# Patient Record
Sex: FEMALE | Race: OTHER | URBAN - METROPOLITAN AREA
[De-identification: names, ages, dates, MRNs, and addresses within clinical notes are randomized per-mention and may not be internally consistent; named-entity substitution may affect disease eponyms.]

---
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
## 2024-01-02 ENCOUNTER — APPOINTMENT (OUTPATIENT)
Dept: OBSTETRICS AND GYNECOLOGY | Facility: CLINIC | Age: 19
End: 2024-01-02
Payer: COMMERCIAL

## 2024-01-11 ENCOUNTER — OFFICE VISIT (OUTPATIENT)
Dept: OBSTETRICS AND GYNECOLOGY | Facility: CLINIC | Age: 19
End: 2024-01-11
Payer: COMMERCIAL

## 2024-01-11 VITALS
DIASTOLIC BLOOD PRESSURE: 72 MMHG | HEIGHT: 64 IN | BODY MASS INDEX: 29.93 KG/M2 | WEIGHT: 175.31 LBS | SYSTOLIC BLOOD PRESSURE: 114 MMHG

## 2024-01-11 DIAGNOSIS — Z30.019 ENCOUNTER FOR FEMALE BIRTH CONTROL: Primary | ICD-10-CM

## 2024-01-11 DIAGNOSIS — R39.9 UTI SYMPTOMS: ICD-10-CM

## 2024-01-11 LAB — PREGNANCY TEST URINE, POC: NEGATIVE

## 2024-01-11 PROCEDURE — 11981 INSERTION DRUG DLVR IMPLANT: CPT | Performed by: ADVANCED PRACTICE MIDWIFE

## 2024-01-11 PROCEDURE — 81025 URINE PREGNANCY TEST: CPT | Performed by: ADVANCED PRACTICE MIDWIFE

## 2024-01-11 PROCEDURE — 87086 URINE CULTURE/COLONY COUNT: CPT

## 2024-01-11 PROCEDURE — 1036F TOBACCO NON-USER: CPT | Performed by: ADVANCED PRACTICE MIDWIFE

## 2024-01-11 RX ORDER — MULTIVIT-MIN/IRON FUM/FOLIC AC 7.5 MG-4
1 TABLET ORAL DAILY
COMMUNITY

## 2024-01-11 RX ORDER — ETONOGESTREL 68 MG/1
1 IMPLANT SUBCUTANEOUS ONCE
COMMUNITY

## 2024-01-11 RX ORDER — LIDOCAINE HYDROCHLORIDE 10 MG/ML
5 INJECTION INFILTRATION; PERINEURAL ONCE
Status: COMPLETED | OUTPATIENT
Start: 2024-01-11 | End: 2024-01-11

## 2024-01-11 RX ADMIN — LIDOCAINE HYDROCHLORIDE 50 MG: 10 INJECTION INFILTRATION; PERINEURAL at 16:27

## 2024-01-11 ASSESSMENT — PAIN SCALES - GENERAL: PAINLEVEL_OUTOF10: 0 - NO PAIN

## 2024-01-11 NOTE — PROGRESS NOTES
NEXPLANON INSERTION PROCEDURE NOTE      Patient here for Nexplanon insertion. No concerns or questions. Reviewed risk/benefits/and insertion and removal of nexplanon. Patient also c/o UTI symptoms and would like her urine checked for an UTI. Urine will be sent.   Patient's pre-procedure questions were answered and a written informed consent form was signed.   Urine hCG negative.  Recommended insertion site on nondominant upper Right arm identified and marked with a pen.  Patient placed supine with arm flexed and hand up above her head.  Insertion site prepped with ChloraPrep.  Site anesthetized with 3 mL of 1% lidocaine.  Skin check for sensation of be adequately anesthetized.  Needle of Nexplanon applicator device inserted at the marked site at a slight angle, then once the skin was punctured the device was lowered to a horizontal position parallel to the arm and with superior traction the needle was completely inserted in the subcuticular space until applicator shaft touched the skin.  The Nexplanon was then unloaded from the applicator device and the applicator was removed from the field.  Inspection of the applicator device revealed the Nexplanon to be absent.  The Nexplanon device was easily palpated by both myself and the patient in the arm.  The site was closed with a Band-Aid.  A compressive gauze wrap was placed around the arm to prevent bruising.  The patient tolerated the procedure well.  There were no complications.  EBL minimal.    ARLET Mcclure-NASREENonsent

## 2024-01-12 LAB — BACTERIA UR CULT: ABNORMAL

## 2024-01-15 ENCOUNTER — TELEPHONE (OUTPATIENT)
Dept: OBSTETRICS AND GYNECOLOGY | Facility: CLINIC | Age: 19
End: 2024-01-15
Payer: COMMERCIAL

## 2024-01-15 DIAGNOSIS — N30.00 ACUTE CYSTITIS WITHOUT HEMATURIA: Primary | ICD-10-CM

## 2024-01-15 RX ORDER — NITROFURANTOIN 25; 75 MG/1; MG/1
100 CAPSULE ORAL 2 TIMES DAILY
Qty: 10 CAPSULE | Refills: 0 | Status: SHIPPED | OUTPATIENT
Start: 2024-01-15 | End: 2024-01-20

## 2024-01-15 NOTE — TELEPHONE ENCOUNTER
----- Message from GIOVANI Mcclure sent at 1/15/2024  8:11 AM EST -----  +UTI noted on her culture. An Rx was sent in for her to use    thanks  ----- Message -----  From: Kathleen Berkowitz MA  Sent: 1/11/2024   4:06 PM EST  To: GIOVANI Mcclure    Results call.

## 2024-06-20 ENCOUNTER — APPOINTMENT (OUTPATIENT)
Dept: OBSTETRICS AND GYNECOLOGY | Facility: CLINIC | Age: 19
End: 2024-06-20
Payer: COMMERCIAL

## 2024-06-20 VITALS — SYSTOLIC BLOOD PRESSURE: 92 MMHG | DIASTOLIC BLOOD PRESSURE: 60 MMHG | WEIGHT: 170.3 LBS | BODY MASS INDEX: 29.23 KG/M2

## 2024-06-20 DIAGNOSIS — N89.8 VAGINAL DISCHARGE: ICD-10-CM

## 2024-06-20 DIAGNOSIS — N92.1 BREAKTHROUGH BLEEDING ON NEXPLANON: Primary | ICD-10-CM

## 2024-06-20 DIAGNOSIS — Z97.5 BREAKTHROUGH BLEEDING ON NEXPLANON: Primary | ICD-10-CM

## 2024-06-20 PROCEDURE — 87086 URINE CULTURE/COLONY COUNT: CPT

## 2024-06-20 PROCEDURE — 99213 OFFICE O/P EST LOW 20 MIN: CPT | Performed by: ADVANCED PRACTICE MIDWIFE

## 2024-06-20 RX ORDER — ESTRADIOL 1 MG/1
1 TABLET ORAL DAILY
Qty: 30 TABLET | Refills: 2 | Status: SHIPPED | OUTPATIENT
Start: 2024-06-20 | End: 2025-06-20

## 2024-06-20 RX ORDER — METRONIDAZOLE 500 MG/1
500 TABLET ORAL 2 TIMES DAILY
Qty: 14 TABLET | Refills: 2 | Status: SHIPPED | OUTPATIENT
Start: 2024-06-20 | End: 2024-06-27

## 2024-06-20 NOTE — PROGRESS NOTES
GYNECOLOGY PROGRESS NOTE        CC:  Patient has Nexplanon placed 1/11/24. No bleeding for several months after insertion and then she started bleeding and has been bleeding almost daily x 3 months. She also c/o her Ph feels off and she feels she has an infection because she were tampons/pads a lot due to the spotting. Patient has no concerns about sexual partners. She has also noted pain with intercourse but she has been bleeding for several months now.   Chief Complaint   Patient presents with    Follow-up     Est pt visit.   Patient states Aub.   States had a period for 3 months straight. Has the nexplanon currently        HPI:  Oriana Chaudhary is here with a complaints of bleeding with nexplanon, concerns about her vaginal Ph, and pain with intercourse.  ROS:  GYN - see HPI        PHYSICAL EXAM:  BP 92/60 (BP Location: Left arm, Patient Position: Sitting, BP Cuff Size: Adult)   Wt 77.2 kg (170 lb 4.8 oz)   BMI 29.23 kg/m²   GEN:  A&O, NAD  PSYCH:  normal affect, non-anxious      IMPRESSION/PLAN:    A: Bleeding with nexplanon. Vaginal discharge. Pain with intercourse since bleeding started.   Plan: 1. Urine cx sent. 2. Rx sent for Flagyl 500mg BID x 7days due to discharge. 3. Rx sent in for Estrace 1mg PO x 30 days. If bleeding does not resolve or resumes with the same pattern then will use 1-2 months of OCPS skipping placebos and if bleeding still not controlled or monthly menses then consider another birth control option.     Problem List Items Addressed This Visit    None       ARLET Mcclure-JABARI

## 2024-06-21 LAB — BACTERIA UR CULT: NORMAL

## 2024-09-26 ENCOUNTER — OFFICE VISIT (OUTPATIENT)
Dept: OBSTETRICS AND GYNECOLOGY | Facility: CLINIC | Age: 19
End: 2024-09-26
Payer: COMMERCIAL

## 2024-09-26 VITALS
HEART RATE: 73 BPM | HEIGHT: 64 IN | SYSTOLIC BLOOD PRESSURE: 111 MMHG | DIASTOLIC BLOOD PRESSURE: 70 MMHG | WEIGHT: 171.4 LBS | BODY MASS INDEX: 29.26 KG/M2

## 2024-09-26 DIAGNOSIS — N89.8 VAGINAL DISCHARGE: ICD-10-CM

## 2024-09-26 DIAGNOSIS — T19.2XXA FOREIGN BODY IN VAGINA, INITIAL ENCOUNTER: ICD-10-CM

## 2024-09-26 PROCEDURE — 87205 SMEAR GRAM STAIN: CPT

## 2024-09-26 PROCEDURE — 99213 OFFICE O/P EST LOW 20 MIN: CPT | Performed by: MIDWIFE

## 2024-09-26 PROCEDURE — 3008F BODY MASS INDEX DOCD: CPT | Performed by: MIDWIFE

## 2024-09-26 NOTE — PROGRESS NOTES
Subjective   Oriana Chaudhary is a 19 y.o. female who presents for evaluation of an abnormal vaginal discharge. Symptoms have been present for 2 days. Vaginal symptoms: local irritation.     Objective   Physical Exam  Constitutional:       Appearance: Normal appearance.   HENT:      Nose: Nose normal.   Eyes:      Pupils: Pupils are equal, round, and reactive to light.   Pulmonary:      Effort: Pulmonary effort is normal.   Genitourinary:     General: Normal vulva.      Comments: Discharge noted throughout the vagina and cervix. No retained objects noted throughout. No lesions. No cysts. Intact throughout.  Musculoskeletal:      Cervical back: Normal range of motion.   Neurological:      Mental Status: She is alert.          Assessment/Plan   A: Vaginitis    P: Reviewed BP, weight     Vaginitis swab     Will treat per results     RTO for annual and sooner PRN

## 2024-09-29 LAB
CLUE CELLS VAG LPF-#/AREA: ABNORMAL /[LPF]
NUGENT SCORE: 3
YEAST VAG WET PREP-#/AREA: PRESENT

## 2024-10-02 DIAGNOSIS — B37.31 VULVOVAGINAL CANDIDIASIS: Primary | ICD-10-CM

## 2024-10-02 RX ORDER — FLUCONAZOLE 150 MG/1
150 TABLET ORAL ONCE
Qty: 1 TABLET | Refills: 0 | Status: SHIPPED | OUTPATIENT
Start: 2024-10-02 | End: 2024-10-02

## 2025-04-21 ENCOUNTER — APPOINTMENT (OUTPATIENT)
Dept: PRIMARY CARE | Facility: CLINIC | Age: 20
End: 2025-04-21
Payer: COMMERCIAL

## 2025-04-21 VITALS
HEIGHT: 64 IN | DIASTOLIC BLOOD PRESSURE: 80 MMHG | BODY MASS INDEX: 28.17 KG/M2 | HEART RATE: 90 BPM | SYSTOLIC BLOOD PRESSURE: 110 MMHG | TEMPERATURE: 98 F | RESPIRATION RATE: 14 BRPM | WEIGHT: 165 LBS | OXYGEN SATURATION: 96 %

## 2025-04-21 DIAGNOSIS — Z13.220 SCREENING FOR HYPERLIPIDEMIA: ICD-10-CM

## 2025-04-21 DIAGNOSIS — Z00.00 WELLNESS EXAMINATION: Primary | ICD-10-CM

## 2025-04-21 DIAGNOSIS — E55.9 VITAMIN D DEFICIENCY: ICD-10-CM

## 2025-04-21 DIAGNOSIS — N92.6 ABNORMAL MENSTRUAL PERIODS: ICD-10-CM

## 2025-04-21 DIAGNOSIS — Z80.3 FAMILY HISTORY OF BREAST CANCER: ICD-10-CM

## 2025-04-21 DIAGNOSIS — Z23 NEED FOR TETANUS BOOSTER: ICD-10-CM

## 2025-04-21 DIAGNOSIS — E53.8 VITAMIN B12 DEFICIENCY: ICD-10-CM

## 2025-04-21 PROCEDURE — 90471 IMMUNIZATION ADMIN: CPT | Performed by: INTERNAL MEDICINE

## 2025-04-21 PROCEDURE — 99385 PREV VISIT NEW AGE 18-39: CPT | Performed by: INTERNAL MEDICINE

## 2025-04-21 PROCEDURE — 90715 TDAP VACCINE 7 YRS/> IM: CPT | Performed by: INTERNAL MEDICINE

## 2025-04-21 PROCEDURE — 3008F BODY MASS INDEX DOCD: CPT | Performed by: INTERNAL MEDICINE

## 2025-04-21 ASSESSMENT — PATIENT HEALTH QUESTIONNAIRE - PHQ9
1. LITTLE INTEREST OR PLEASURE IN DOING THINGS: NOT AT ALL
2. FEELING DOWN, DEPRESSED OR HOPELESS: NOT AT ALL
SUM OF ALL RESPONSES TO PHQ9 QUESTIONS 1 AND 2: 0

## 2025-04-21 NOTE — PROGRESS NOTES
"Subjective    Oriana Chaudhary is a 19 y.o. female who presents for New Patient Visit.  HPI    NP to establish     Family hx of breat cancer   Wants breast mri   No tobacco  No excessive alcohol  Lives at home.   Work at [employer].  Is going to study realestate  She exercises she does a combo of cadio and weight.  Her periods are irregular. Has the nexplanon    Review of Systems   All other systems reviewed and are negative.        Objective     /80 (BP Location: Left arm, Patient Position: Sitting, BP Cuff Size: Adult)   Pulse 90   Temp 36.7 °C (98 °F) (Skin)   Resp 14   Ht 1.626 m (5' 4\")   Wt 74.8 kg (165 lb)   LMP 04/16/2025   SpO2 96%   BMI 28.32 kg/m²    Physical Exam  Vitals reviewed.   Constitutional:       General: She is not in acute distress.     Appearance: Normal appearance.   HENT:      Mouth/Throat:      Mouth: Mucous membranes are moist.   Neck:      Thyroid: No thyroid mass or thyromegaly.   Cardiovascular:      Rate and Rhythm: Normal rate and regular rhythm.      Pulses: Normal pulses.      Heart sounds: Normal heart sounds.   Pulmonary:      Effort: Pulmonary effort is normal.      Breath sounds: Normal breath sounds.   Chest:      Chest wall: No mass or tenderness.   Breasts:     Right: Normal.      Left: Normal.   Abdominal:      General: Abdomen is flat.      Palpations: Abdomen is soft.      Tenderness: There is no abdominal tenderness.   Musculoskeletal:         General: No swelling.      Cervical back: Neck supple. No tenderness.   Lymphadenopathy:      Cervical: No cervical adenopathy.      Upper Body:      Right upper body: No supraclavicular or axillary adenopathy.      Left upper body: No supraclavicular or axillary adenopathy.   Skin:     General: Skin is warm and dry.   Neurological:      General: No focal deficit present.      Mental Status: She is alert.   Psychiatric:         Attention and Perception: Attention and perception normal.         Mood and Affect: " Mood and affect normal.       Health Maintenance Due   Topic Date Due    Yearly Adult Physical  Never done    Lipid Panel  Never done    Hearing Screening (1) Never done    Meningococcal B Vaccine (2 of 2 - Bexsero SCDM 2-dose series) 05/13/2020    Chlamydia and Gonorrhea Screening  06/13/2023    COVID-19 Vaccine (1 - 2024-25 season) Never done          Assessment/Plan   Problem List Items Addressed This Visit    None  Visit Diagnoses         Wellness examination    -  Primary    Relevant Orders    CBC    Comprehensive Metabolic Panel      Screening for hyperlipidemia        Relevant Orders    Lipid Panel      Abnormal menstrual periods        Relevant Orders    TSH with reflex to Free T4 if abnormal      Family history of breast cancer        Relevant Orders    MR breast bilateral w IV contrast fast screening self pay    Referral to Breast Surgery      Vitamin D deficiency        Relevant Orders    Vitamin D 25-Hydroxy,Total (for eval of Vitamin D levels)      Vitamin B12 deficiency        Relevant Orders    Vitamin B12      Need for tetanus booster        Relevant Orders    Tdap vaccine, age 7 years and older (Completed)        Check labs mri refer to breast surgery  for strong family hx of breast cancer  Check labs.   Tetanus given  She is up to date with her hpv.  Has nexplanon. Recommend she discuss with her gyn.

## 2025-05-27 ENCOUNTER — APPOINTMENT (OUTPATIENT)
Dept: RADIOLOGY | Facility: HOSPITAL | Age: 20
End: 2025-05-27
Payer: COMMERCIAL

## 2025-06-02 ENCOUNTER — TELEPHONE (OUTPATIENT)
Dept: SURGICAL ONCOLOGY | Facility: CLINIC | Age: 20
End: 2025-06-02
Payer: COMMERCIAL

## 2025-06-02 NOTE — TELEPHONE ENCOUNTER
I left 2 Vm's for Oriana to get information for her appointment with Mirian Camacho.  I would like more information about her female and family breast cancer history for the breast cancer risk model.

## 2025-06-03 ASSESSMENT — ENCOUNTER SYMPTOMS
PSYCHIATRIC NEGATIVE: 1
RESPIRATORY NEGATIVE: 1
EYES NEGATIVE: 1
MUSCULOSKELETAL NEGATIVE: 1
NEUROLOGICAL NEGATIVE: 1
CONSTITUTIONAL NEGATIVE: 1
GASTROINTESTINAL NEGATIVE: 1
HEMATOLOGIC/LYMPHATIC NEGATIVE: 1
CARDIOVASCULAR NEGATIVE: 1
ENDOCRINE NEGATIVE: 1

## 2025-06-03 NOTE — PROGRESS NOTES
[facility] [place]  Oriana Chaudhary female   2005 20 y.o.   [id number]      Chief Complaint  New patient, high risk evaluation.    History Of Present Illness  Oriana Chaudhary is a 20 y.o. female presenting to the breast center for a high risk evaluation. She denies breast surgery or biopsy. She has family history of breast cancer, see below.    REPRODUCTIVE HISTORY: menarche age, GP, first birth age, , OCP's, premenopausal, LMP                           FAMILY CANCER HISTORY:   Mother: Breast cancer  Maternal Aunt: Breast cancer  Maternal Grandmother: Breast cancer    Review of Systems  Review of Systems   Constitutional: Negative.    HENT:  Negative.     Eyes: Negative.    Respiratory: Negative.     Cardiovascular: Negative.    Gastrointestinal: Negative.    Endocrine: Negative.    Genitourinary: Negative.     Musculoskeletal: Negative.    Neurological: Negative.    Hematological: Negative.    Psychiatric/Behavioral: Negative.          Surgical History  She has a past surgical history that includes [place] tooth extraction.     Family History  Cancer-related family history includes Breast cancer in her maternal grandmother, mother, mother's sister, and another family member.     Social History  She reports that she has never smoked. She has never used smokeless tobacco. She reports current alcohol use. She reports current drug use. Drug: Marijuana.    Allergies  Cephalosporins and Azithromycin    Medications  Current Outpatient Medications   Medication Instructions    etonogestrel-eluting contraceptive (Nexplanon) 68 mg implant implant 1 each, Once    L.acid/L.casei/B.bif/B.jose/FOS (PROBIOTIC BLEND ORAL) Every Day    multivitamin with minerals tablet 1 tablet, Daily       Last Recorded Vitals  There were no vitals filed for this visit.    Physical Exam  Patient is alert and oriented x3 and in a relaxed and appropriate mood. Her gait is steady and hand grasps are equal. Sclera is clear. The  breasts are nearly symmetrical. The tissue is soft without palpable abnormalities, discrete nodules or masses. The skin and nipples appear normal. There is no cervical, supraclavicular or axillary lymphadenopathy.     Relevant Results and Imaging  none    Risk Models            Orders  No orders of the defined types were placed in this encounter.      Visit Diagnosis  No diagnosis found.      Assessment      Plan  ***    Patient Discussion/Summary      High risk breast surveillance care plan:  Yearly mammogram with digital breast tomosynthesis  Twice yearly clinical breast examinations  Breast MRI (to schedule call [phone])  Monthly self breast examinations &/or regular self breast awareness  Vitamin D3 2000 IU/daily (over the counter) unless your PCP recommends you take a specific dose  Exercise 3-4 times per week for 45-60 minutes  Limit alcohol to 3-4 drinks per week if you are menopausal  Eat healthy low-fat diet with lots of vegetable & fruits        IMPORTANT INFORMATION REGARDING YOUR RESULTS    You can see your health information, review clinical summaries from office visits & test results online when you follow your health with MY  Chart, a personal health record. To sign up go to [email or web address]. If you need assistance with signing up or trouble getting into your account call [facility] Patient Line 24/7 at [phone].    My office phone number is [phone] if you need to get in touch with me or have additional questions or concerns. Thank you for choosing [facility] and trusting me as your healthcare provider. I look forward to seeing you again at your next office visit. I am honored to be a provider on your health care team and I remain dedicated to helping you achieve your health goals.    Mirian Camacho, APRN-CNP

## 2025-06-06 ENCOUNTER — APPOINTMENT (OUTPATIENT)
Dept: SURGICAL ONCOLOGY | Facility: HOSPITAL | Age: 20
End: 2025-06-06
Payer: COMMERCIAL

## 2025-06-10 ENCOUNTER — APPOINTMENT (OUTPATIENT)
Dept: RADIOLOGY | Facility: HOSPITAL | Age: 20
End: 2025-06-10
Payer: COMMERCIAL

## 2025-06-10 DIAGNOSIS — Z80.3 FAMILY HISTORY OF BREAST CANCER: ICD-10-CM

## 2025-06-10 PROCEDURE — 6100000003 BI MR BREAST BILATERAL WITH CONTRAST FAST SCREENING SELF PAY

## 2025-06-10 PROCEDURE — A9575 INJ GADOTERATE MEGLUMI 0.1ML: HCPCS | Performed by: INTERNAL MEDICINE

## 2025-06-10 PROCEDURE — 2550000001 HC RX 255 CONTRASTS: Performed by: INTERNAL MEDICINE

## 2025-06-10 RX ORDER — GADOTERATE MEGLUMINE 376.9 MG/ML
15 INJECTION INTRAVENOUS
Status: COMPLETED | OUTPATIENT
Start: 2025-06-10 | End: 2025-06-10

## 2025-06-10 RX ADMIN — GADOTERATE MEGLUMINE 15 ML: 376.9 INJECTION INTRAVENOUS at 08:37

## 2025-07-23 ENCOUNTER — TELEPHONE (OUTPATIENT)
Dept: SURGICAL ONCOLOGY | Facility: HOSPITAL | Age: 20
End: 2025-07-23
Payer: COMMERCIAL

## 2025-07-23 NOTE — TELEPHONE ENCOUNTER
Left patient voicemail to obtain health and family history for upcoming appt on 7/29 in breast center.

## 2025-09-11 ENCOUNTER — APPOINTMENT (OUTPATIENT)
Dept: OBSTETRICS AND GYNECOLOGY | Facility: CLINIC | Age: 20
End: 2025-09-11
Payer: COMMERCIAL

## 2025-10-03 ENCOUNTER — APPOINTMENT (OUTPATIENT)
Dept: OBSTETRICS AND GYNECOLOGY | Facility: CLINIC | Age: 20
End: 2025-10-03
Payer: COMMERCIAL

## 2026-04-27 ENCOUNTER — APPOINTMENT (OUTPATIENT)
Dept: PRIMARY CARE | Facility: CLINIC | Age: 21
End: 2026-04-27
Payer: COMMERCIAL